# Patient Record
Sex: MALE | Race: ASIAN | NOT HISPANIC OR LATINO | URBAN - METROPOLITAN AREA
[De-identification: names, ages, dates, MRNs, and addresses within clinical notes are randomized per-mention and may not be internally consistent; named-entity substitution may affect disease eponyms.]

---

## 2020-05-14 ENCOUNTER — APPOINTMENT (RX ONLY)
Dept: URBAN - METROPOLITAN AREA CLINIC 26 | Facility: CLINIC | Age: 25
Setting detail: DERMATOLOGY
End: 2020-05-14

## 2020-05-14 DIAGNOSIS — L73.2 HIDRADENITIS SUPPURATIVA: ICD-10-CM

## 2020-05-14 PROCEDURE — 99202 OFFICE O/P NEW SF 15 MIN: CPT

## 2020-05-14 PROCEDURE — ? PRESCRIPTION

## 2020-05-14 PROCEDURE — ? TREATMENT REGIMEN

## 2020-05-14 PROCEDURE — ? COUNSELING

## 2020-05-14 PROCEDURE — ? PHOTO-DOCUMENTATION

## 2020-05-14 PROCEDURE — ? ADDITIONAL NOTES

## 2020-05-14 RX ORDER — CLINDAMYCIN PHOSPHATE 10 MG/ML
LOTION TOPICAL QD
Qty: 1 | Refills: 3 | Status: ERX | COMMUNITY
Start: 2020-05-14

## 2020-05-14 RX ADMIN — CLINDAMYCIN PHOSPHATE: 10 LOTION TOPICAL at 00:00

## 2020-05-14 NOTE — PROCEDURE: TREATMENT REGIMEN
Detail Level: Simple
Initiate Treatment: BP wash daily \\n\\ndoxycycline hyclate 50 mg tablet \\nSig: One tab PO bid\\n\\nclindamycin 1 % lotion QD\\nDays Supply: 30\\nSig: Apply layer of lotion to affected areas once-twice daily.  Use with BP wash

## 2020-05-14 NOTE — PROCEDURE: ADDITIONAL NOTES
Additional Notes: After discussion of the risks, benefits and limitations with respect to this Telehealth visit, including potential limitations in picture and video quality, the patient has given verbal consent to proceed with this Telehealth visit. Interactive audio and video telecommunications were used to permit real-time communication between myself and the patient.  This Telehealth visit was performed due to the national COVID-19 emergency and recommended social distancing.
Detail Level: Simple
Additional Notes: Patient got a job in [place] and is moving at the end of June. I would like to try a course of Accutane and potentially Humira if that is not helpful. Given that the patient is moving, we will start with low-dose doxycycline and topical antibiotics with benzoyl peroxide to see how he responds to the treatment before we progress to the next course of action, and he can establish care with someone in [place].

## 2020-10-23 ENCOUNTER — APPOINTMENT (RX ONLY)
Dept: URBAN - METROPOLITAN AREA CLINIC 26 | Facility: CLINIC | Age: 25
Setting detail: DERMATOLOGY
End: 2020-10-23

## 2020-10-23 DIAGNOSIS — L73.2 HIDRADENITIS SUPPURATIVA: ICD-10-CM | Status: IMPROVED

## 2020-10-23 PROCEDURE — ? TREATMENT REGIMEN

## 2020-10-23 PROCEDURE — 99212 OFFICE O/P EST SF 10 MIN: CPT

## 2020-10-23 PROCEDURE — ? COUNSELING

## 2020-10-23 PROCEDURE — ? ADDITIONAL NOTES

## 2020-10-23 NOTE — PROCEDURE: ADDITIONAL NOTES
Detail Level: Simple
Additional Notes: Patient got a job in [place] and is moved at the end of June. Would consider a course of Accutane and potentially Humira if flares in the future but quiet and very well controlled. Responded beautifully to low-dose doxycycline and topical antibiotics with benzoyl peroxide.  he can establish care with someone in [place] if he wishes to pursue accutane. Until then, decrease doxy to daily. May discontinue altogether if desire and take PRN.
Additional Notes: Patient consent was obtained to proceed with the visit and recommended plan of care after discussion of all risks and benefits, including the risks of COVID-19 exposure.
Additional Notes: Counseled on the use of laser hair removal to help with the long term treatment of condition.
Additional Notes: Recommended [email or web address] to help locate a board certified dermatologist in the patient’s location.

## 2020-10-23 NOTE — PROCEDURE: TREATMENT REGIMEN
Detail Level: Simple
Continue Regimen: BP wash daily \\n\\nclindamycin 1 % lotion QD\\nDays Supply: 30\\nSig: Apply layer of lotion to affected areas once-twice daily.  Use with BP wash
Initiate Treatment: Zinc Gluconate 90 mg/day. Start small doses
Modify Regimen: doxycycline hyclate 50 mg tablet \\nSig: One tab PO QD